# Patient Record
(demographics unavailable — no encounter records)

---

## 2025-02-04 NOTE — DISCUSSION/SUMMARY
[Obstructive Sleep Apnea] : obstructive sleep apnea [Moderate] : moderate in severity [Alcohol Avoidance] : alcohol avoidance [Sedative Avoidance] : sedative avoidance [Weight Loss Program] : weight loss program [CPAP] : CPAP [FreeTextEntry1] : Patient continue failure appears mostly due to mask fit.  I once again fitted the patient with a small F30 I fullface mask and supplied him with a sample.  If this fails patient will undergo BiPAP titration [Not Responding to Treatment] : not responding to treatment [de-identified] : Complicated by severe air leak [de-identified] : AirSense 11 AutoSet

## 2025-02-04 NOTE — PROCEDURE
[FreeTextEntry1] : EXAM: NM PULM VENTILATION PERFUS IMG  PROCEDURE DATE: 10/29/2021 INTERPRETATION: RADIOPHARMACEUTICAL: 1 mCi Tc-99m-DTPA aerosol by inhalation; 6 mCi Tc-99m-MAA, I.V.  CLINICAL STATEMENT: 88-year-old male with shortness of breath on exertion for 2 years. Evaluate for pulmonary embolism.  TECHNIQUE: Ventilation and perfusion images of the lungs were obtained following administration of Tc-99m-DTPA and Tc-99m-MAA. Images were obtained in the anterior, posterior, both lateral, and all 4 oblique projections. The study was interpreted in conjunction with chest radiograph of 10/29/2021.  No prior VQ scan.  FINDINGS: There is heterogeneous tracer distribution in the lungs, worse on the ventilation images. There are no discrete segmental perfusion defects.  IMPRESSION:  Very low probability of pulmonary embolus. (PACS reviewed with Pt)   11/4/2022: Lung volumes and diffusion capacity normal [TextEntry] : Patient is CPAP was set up in the office with no significant air leak seen with his nasal and 20 small mask.  Patient then stated that he would wake up in the morning with his cheeks blown up and air leakage from his mouth.  He was then subsequently sized and provided with a air touch F20 large mask.  The mask was tested on his equipment without any evidence of air leak.

## 2025-02-04 NOTE — HISTORY OF PRESENT ILLNESS
[Obstructive Sleep Apnea] : obstructive sleep apnea [Lab] : lab [APAP:] : APAP [FreeTextEntry1] : 91-year-old male with a distant history of tobacco use DC'd 40 years ago, coronary artery disease status post stent to the left main, CLL seen today for follow-up of sleep apnea as well as chronic dyspnea which has been felt to be secondary to age, deconditioning, obesity and possible chronotropic insufficiency.  Prior pulmonary function test were consistent only with mild degree of restriction otherwise unremarkable.    [Awakes Unrefreshed] : does not awaken unrefreshed [Awakes with Dry Mouth] : does not awaken with dry mouth [Awakes with Headache] : does not awaken with headache [Snoring] : no snoring [Witnessed Apneas] : no witnessed apneas [Full Face mask] : full face mask [TextBox_77] : 8207 [TextBox_79] : 0 600 [TextBox_81] : 5 [TextBox_83] : 1hr [TextBox_89] : 0 [TextBox_100] : 11/13/2018 [TextBox_108] : 20, (REM 42.5) [TextBox_125] : 10-20 [TextBox_127] : 12/29/2024 [TextBox_129] : 1/27/2025 [TextBox_133] : 100 [TextBox_137] : 97 [TextBox_141] : 5 [TextBox_143] : 57 [TextBox_147] : 36.3 [TextBox_158] : Adapt [TextBox_162] : 12/21/2023 [TextBox_165] : Average pressure 12.9 cm H2O, average leak 27 L/min.  Did not like F40 mask  [ESS] : 4

## 2025-02-04 NOTE — CONSULT LETTER
[Dear  ___] : Dear  [unfilled], [Consult Letter:] : I had the pleasure of evaluating your patient, [unfilled]. [Please see my note below.] : Please see my note below. [Sincerely,] : Sincerely, [DrJered  ___] : Dr. BARRAGAN [DrJered ___] : Dr. BARRAGAN [FreeTextEntry3] : Roni Granados MD FCCP\par  Pulmonary/Critical Care/Sleep Medicine\par  Department of Internal Medicine\par  \par  [facility] [employer] [facility]\par

## 2025-03-21 NOTE — CONSULT LETTER
[Dear  ___] : Dear  [unfilled], [Consult Letter:] : I had the pleasure of evaluating your patient, [unfilled]. [Please see my note below.] : Please see my note below. [Sincerely,] : Sincerely, [DrJered  ___] : Dr. BARRAGAN [DrJered ___] : Dr. BARRAGAN [FreeTextEntry3] : Roni Granados MD FCCP\par  Pulmonary/Critical Care/Sleep Medicine\par  Department of Internal Medicine\par  \par  [employer]\par

## 2025-03-21 NOTE — HISTORY OF PRESENT ILLNESS
[Obstructive Sleep Apnea] : obstructive sleep apnea [Lab] : lab [APAP:] : APAP [Nasal mask] : nasal mask [FreeTextEntry1] : 91-year-old male with a distant history of tobacco use DC'd 40 years ago, coronary artery disease status post stent to the left main, CLL seen today for follow-up of sleep apnea as well as chronic dyspnea which has been felt to be secondary to age, deconditioning, obesity and possible chronotropic insufficiency.  Prior pulmonary function test were consistent only with mild degree of restriction otherwise unremarkable.    [Telephone (audio)] : This telephonic visit was provided via audio only technology. [Awakes Unrefreshed] : does not awaken unrefreshed [Awakes with Dry Mouth] : does not awaken with dry mouth [Awakes with Headache] : does not awaken with headache [Snoring] : no snoring [Witnessed Apneas] : no witnessed apneas [TextBox_77] : 5139 [TextBox_79] : 0 600 [TextBox_81] : 5 [TextBox_83] : 1hr [TextBox_100] : 11/13/2018 [TextBox_89] : 0 [TextBox_108] : 20, (REM 42.5) [TextBox_125] : 10-20 [TextBox_127] : 2/19/2025 [TextBox_129] : 3/20/2025 [TextBox_133] : 100 [TextBox_137] : 97 [TextBox_141] : 5 [TextBox_143] : 57 [TextBox_147] : 31.5 [TextBox_158] : Adapt [TextBox_160] : N20 [TextBox_162] : 12/21/2023 [TextBox_165] : Average pressure 11.6 cm H2O, average leak 28.1 L/min.   Does not want to do BIPAP titration. Likes N20  [ESS] : 4